# Patient Record
Sex: FEMALE | Race: WHITE | NOT HISPANIC OR LATINO | Employment: PART TIME | URBAN - NONMETROPOLITAN AREA
[De-identification: names, ages, dates, MRNs, and addresses within clinical notes are randomized per-mention and may not be internally consistent; named-entity substitution may affect disease eponyms.]

---

## 2023-03-01 LAB
APPEARANCE, URINE: CLEAR
BACTERIA, URINE: ABNORMAL /HPF
BILIRUBIN, URINE: NEGATIVE
BLOOD, URINE: ABNORMAL
COLOR, URINE: ABNORMAL
GLUCOSE, URINE: NEGATIVE MG/DL
KETONES, URINE: NEGATIVE MG/DL
LEUKOCYTE ESTERASE, URINE: ABNORMAL
NITRITE, URINE: NEGATIVE
PH, URINE: 6 (ref 5–8)
PROTEIN, URINE: NEGATIVE MG/DL
RBC, URINE: 1 /HPF (ref 0–5)
SPECIFIC GRAVITY, URINE: 1 (ref 1–1.03)
UROBILINOGEN, URINE: <2 MG/DL (ref 0–1.9)
WBC CLUMPS, URINE: ABNORMAL /HPF
WBC, URINE: 29 /HPF (ref 0–5)

## 2023-03-03 LAB — URINE CULTURE: ABNORMAL

## 2023-03-27 LAB
ALANINE AMINOTRANSFERASE (SGPT) (U/L) IN SER/PLAS: 28 U/L (ref 7–45)
ALBUMIN (G/DL) IN SER/PLAS: 4.1 G/DL (ref 3.4–5)
ALKALINE PHOSPHATASE (U/L) IN SER/PLAS: 74 U/L (ref 33–136)
ANION GAP IN SER/PLAS: 9 MMOL/L (ref 10–20)
ASPARTATE AMINOTRANSFERASE (SGOT) (U/L) IN SER/PLAS: 24 U/L (ref 9–39)
BILIRUBIN TOTAL (MG/DL) IN SER/PLAS: 1.1 MG/DL (ref 0–1.2)
CALCIUM (MG/DL) IN SER/PLAS: 9.4 MG/DL (ref 8.6–10.3)
CARBON DIOXIDE, TOTAL (MMOL/L) IN SER/PLAS: 31 MMOL/L (ref 21–32)
CHLORIDE (MMOL/L) IN SER/PLAS: 104 MMOL/L (ref 98–107)
CHOLESTEROL (MG/DL) IN SER/PLAS: 198 MG/DL (ref 0–199)
CHOLESTEROL IN HDL (MG/DL) IN SER/PLAS: 38.7 MG/DL
CHOLESTEROL/HDL RATIO: 5.1
CREATININE (MG/DL) IN SER/PLAS: 0.51 MG/DL (ref 0.5–1.05)
GFR FEMALE: >90 ML/MIN/1.73M2
GLUCOSE (MG/DL) IN SER/PLAS: 102 MG/DL (ref 74–99)
LDL: 132 MG/DL (ref 0–99)
POTASSIUM (MMOL/L) IN SER/PLAS: 4.2 MMOL/L (ref 3.5–5.3)
PROTEIN TOTAL: 6.5 G/DL (ref 6.4–8.2)
SODIUM (MMOL/L) IN SER/PLAS: 140 MMOL/L (ref 136–145)
THYROTROPIN (MIU/L) IN SER/PLAS BY DETECTION LIMIT <= 0.05 MIU/L: 0.01 MIU/L (ref 0.44–3.98)
THYROXINE (T4) FREE (NG/DL) IN SER/PLAS: 1.74 NG/DL (ref 0.61–1.12)
TRIGLYCERIDE (MG/DL) IN SER/PLAS: 136 MG/DL (ref 0–149)
UREA NITROGEN (MG/DL) IN SER/PLAS: 12 MG/DL (ref 6–23)
VLDL: 27 MG/DL (ref 0–40)

## 2023-03-28 ENCOUNTER — TELEPHONE (OUTPATIENT)
Dept: PRIMARY CARE | Facility: CLINIC | Age: 68
End: 2023-03-28

## 2023-03-28 DIAGNOSIS — E03.9 HYPOTHYROIDISM, UNSPECIFIED TYPE: Primary | ICD-10-CM

## 2023-03-28 DIAGNOSIS — E03.9 ACQUIRED HYPOTHYROIDISM: Primary | ICD-10-CM

## 2023-03-28 LAB
ESTIMATED AVERAGE GLUCOSE FOR HBA1C: 126 MG/DL
HEMOGLOBIN A1C/HEMOGLOBIN TOTAL IN BLOOD: 6 %

## 2023-03-28 RX ORDER — LEVOTHYROXINE SODIUM 125 UG/1
125 TABLET ORAL DAILY
Qty: 90 TABLET | Refills: 1 | Status: SHIPPED | OUTPATIENT
Start: 2023-03-28 | End: 2023-06-12 | Stop reason: ALTCHOICE

## 2023-03-28 NOTE — TELEPHONE ENCOUNTER
I will of the thyroid medication to her mail away pharmacy.  She should repeat labs 6 weeks after starting the new medication

## 2023-03-28 NOTE — TELEPHONE ENCOUNTER
Saw labs - she is taking 175 mg Synthroid a day - if sending in new RX send to [facility].    She is not taking any Biotin.

## 2023-05-01 DIAGNOSIS — N76.0 ACUTE VAGINITIS: ICD-10-CM

## 2023-05-02 DIAGNOSIS — N76.0 ACUTE VAGINITIS: ICD-10-CM

## 2023-06-09 ENCOUNTER — LAB (OUTPATIENT)
Dept: LAB | Facility: LAB | Age: 68
End: 2023-06-09
Payer: MEDICARE

## 2023-06-09 ENCOUNTER — TELEPHONE (OUTPATIENT)
Dept: PRIMARY CARE | Facility: CLINIC | Age: 68
End: 2023-06-09

## 2023-06-09 DIAGNOSIS — E03.9 ACQUIRED HYPOTHYROIDISM: Primary | ICD-10-CM

## 2023-06-09 DIAGNOSIS — E03.9 HYPOTHYROIDISM, UNSPECIFIED TYPE: ICD-10-CM

## 2023-06-09 LAB
THYROTROPIN (MIU/L) IN SER/PLAS BY DETECTION LIMIT <= 0.05 MIU/L: 0.1 MIU/L (ref 0.44–3.98)
THYROXINE (T4) FREE (NG/DL) IN SER/PLAS: 1.3 NG/DL (ref 0.61–1.12)

## 2023-06-09 PROCEDURE — 84443 ASSAY THYROID STIM HORMONE: CPT

## 2023-06-09 PROCEDURE — 36415 COLL VENOUS BLD VENIPUNCTURE: CPT

## 2023-06-09 PROCEDURE — 84439 ASSAY OF FREE THYROXINE: CPT

## 2023-06-09 NOTE — TELEPHONE ENCOUNTER
The patient asked to update the provider. She had labs done this morning.  She has had cramps in her foot and calf since changing the dose from 175 to 125. She thinks this was too big of a change.  Please send her medication to the mail order if there a change in her dose.

## 2023-06-12 DIAGNOSIS — E03.9 ACQUIRED HYPOTHYROIDISM: Primary | ICD-10-CM

## 2023-06-12 RX ORDER — LEVOTHYROXINE SODIUM 88 UG/1
88 TABLET ORAL DAILY
Qty: 30 TABLET | Refills: 11 | Status: SHIPPED | OUTPATIENT
Start: 2023-06-12 | End: 2023-06-15 | Stop reason: SDUPTHER

## 2023-06-12 NOTE — TELEPHONE ENCOUNTER
Cramping can occur if she is taking too much thyroid medication,  I hope if she reduces her dose she will notice less cramping,  I will send in to the pharmacy the lower dose of the thyroid medication and orders for her to repeat the labs in 6 weeks

## 2023-08-28 ENCOUNTER — LAB (OUTPATIENT)
Dept: LAB | Facility: LAB | Age: 68
End: 2023-08-28
Payer: MEDICARE

## 2023-08-28 DIAGNOSIS — E03.9 ACQUIRED HYPOTHYROIDISM: ICD-10-CM

## 2023-08-28 LAB
THYROTROPIN (MIU/L) IN SER/PLAS BY DETECTION LIMIT <= 0.05 MIU/L: 7.78 MIU/L (ref 0.44–3.98)
THYROXINE (T4) FREE (NG/DL) IN SER/PLAS: 0.76 NG/DL (ref 0.61–1.12)

## 2023-08-28 PROCEDURE — 84443 ASSAY THYROID STIM HORMONE: CPT

## 2023-08-28 PROCEDURE — 84439 ASSAY OF FREE THYROXINE: CPT

## 2023-08-28 PROCEDURE — 36415 COLL VENOUS BLD VENIPUNCTURE: CPT

## 2023-08-31 DIAGNOSIS — E03.9 ACQUIRED HYPOTHYROIDISM: ICD-10-CM

## 2023-08-31 DIAGNOSIS — E03.9 HYPOTHYROIDISM, UNSPECIFIED TYPE: Primary | ICD-10-CM

## 2023-08-31 RX ORDER — LEVOTHYROXINE SODIUM 100 UG/1
100 TABLET ORAL DAILY
Qty: 90 TABLET | Refills: 3 | Status: SHIPPED | OUTPATIENT
Start: 2023-08-31 | End: 2023-09-25

## 2023-09-17 NOTE — PROGRESS NOTES
Subjective   Patient ID: Debora Monzon is a 67 y.o. female who presents for Medicare Annual Wellness Visit Subsequent.    The patient is compliant with medications. Patient denies any side effects to the medications. The patient Is clinically stable so we will continue with current medications and lab work to confirm status ordered.       HPI    Encounter for subsequent AWV: Medicare wellness visit done, patient is in satisfactory living circumstances where the patient's needs are met.  This patient is advised to develop or update their living will and provide us a copy for the chart.    Hyperlipidemia: The patient is present today for a follow up of hyperlipidemia. She denies having any leg cramping in particular. She is trying to follow a low-fat diet.     Hypothyroidism: Patient presents for a follow up of their thyroid function. Energy wise that patient is poorly. She states that ever since she went into menopause her thyroid problems have gone out of control and is inquiring about a referral to Endocrinology.    Elevated fasting blood sugar: Patient is present today for a follow up of elevated fasting blood sugar. Is clinically stable so we will continue with current medications and lab work to confirm status ordered.     Vitamin D Deficiency: The patient presents today for a follow up of Vitamin D deficiency. Patient denies any side effects to the medications.     Pt is due for a mammogram in November.     Patient is getting the scales on the back of her head,     Review of Systems   Constitutional:  Negative for chills and fever.   HENT:  Negative for congestion, ear pain, hearing loss, rhinorrhea, sinus pressure and sinus pain.    Eyes:  Negative for pain and visual disturbance.   Respiratory:  Negative for chest tightness and shortness of breath.    Cardiovascular:  Negative for chest pain and palpitations.   Gastrointestinal:  Negative for abdominal pain, blood in stool, constipation, diarrhea, nausea and  "vomiting.   Genitourinary:  Negative for frequency and urgency.   Musculoskeletal:  Negative for joint swelling, neck pain and neck stiffness.   Neurological:  Negative for headaches.   Psychiatric/Behavioral:  Negative for sleep disturbance.          Objective   /71 (BP Location: Right arm, Patient Position: Sitting, BP Cuff Size: Adult)   Pulse 66   Temp 36.2 °C (97.2 °F) (Temporal)   Ht 1.638 m (5' 4.5\")   Wt 62.2 kg (137 lb 3.2 oz)   BMI 23.19 kg/m²     Physical Exam  Vitals reviewed.   Constitutional:       Appearance: Normal appearance.   HENT:      Head:      Comments: Dermatitis on scalp     Right Ear: Tympanic membrane and ear canal normal.      Left Ear: Tympanic membrane and ear canal normal.   Neck:      Vascular: No carotid bruit.   Cardiovascular:      Rate and Rhythm: Normal rate and regular rhythm.      Pulses: Normal pulses.      Heart sounds: Normal heart sounds.   Pulmonary:      Effort: Pulmonary effort is normal. No respiratory distress.      Breath sounds: Normal breath sounds. No wheezing.   Abdominal:      General: There is no distension.      Palpations: Abdomen is soft. There is no mass.      Tenderness: There is no abdominal tenderness. There is no right CVA tenderness, left CVA tenderness, guarding or rebound.   Musculoskeletal:      Cervical back: Normal range of motion and neck supple. No rigidity.      Right lower leg: No edema.      Left lower leg: No edema.   Lymphadenopathy:      Cervical: No cervical adenopathy.   Neurological:      Mental Status: She is alert.         Assessment/Plan   Problem List Items Addressed This Visit       Elevated fasting blood sugar    Relevant Orders    Hemoglobin A1C    Hyperlipidemia    Relevant Orders    CBC and Auto Differential    Lipid Panel    Comprehensive Metabolic Panel    Hypothyroidism    Relevant Orders    TSH with reflex to Free T4 if abnormal    Referral to Endocrinology    Vitamin D deficiency    Relevant Orders    Vitamin D " 25-Hydroxy,Total (for eval of Vitamin D levels)    Encounter for screening mammogram for breast cancer    Relevant Orders    BI mammo bilateral diagnostic    Encounter for subsequent annual wellness visit (AWV) in Medicare patient - Primary    Relevant Orders    Follow Up In [facility] - PCP - Medicare Annual     Other Visit Diagnoses       Acute vaginitis        Relevant Medications    estrogens, conjugated, (Premarin) vaginal cream (Start on 9/25/2023)    History of screening mammography        Relevant Orders    BI mammo bilateral diagnostic    Dermatosis of scalp        Relevant Medications    clobetasol (Temovate) 0.05 % external solution            1.  Well visit    Today in the office you had your annual wellness exam    You are due for a mammogram for breast cancer screening in November we will give you that order    You are up-to-date with your colonoscopy for colon cancer screening    Please have labs done today we will check kidney function liver function blood sugar screening for diabetes check your cholesterol we will check your vitamin D and we will check her thyroid    With your history of hypothyroidism and postmenopausal symptoms we will give you a referral to meet with endocrinology    Continue eating a heart healthy diet a diet rich with fresh fruit and fresh vegetable 5-7 servings a day if possible along with lean protein avoid simple sugars and fast foods    Keep being active exercise dancing 30 minutes of activity a day is ideal certainly would call if you have chest pains with your activities    You have some dermatitis on your scalp we will prescribe a steroid solution clobetasol this can be applied once or twice a day    I did send a prescription in for your Premarin to your mail away    If all labs returned normal and you remain healthy I will see you back in 1 year    You did receive your flu shot today    You could consider getting a pneumonia vaccine and shingles vaccine at  the pharmacy        Follow up in: 12 month(s) or sooner if needed with labs today.     Scribe Attestation  By signing my name below, I, Martha Alarcon   attest that this documentation has been prepared under the direction and in the presence of Gurjit Streeter MD.

## 2023-09-22 ENCOUNTER — LAB (OUTPATIENT)
Dept: LAB | Facility: LAB | Age: 68
End: 2023-09-22
Payer: MEDICARE

## 2023-09-22 ENCOUNTER — OFFICE VISIT (OUTPATIENT)
Dept: PRIMARY CARE | Facility: CLINIC | Age: 68
End: 2023-09-22
Payer: MEDICARE

## 2023-09-22 VITALS
HEIGHT: 65 IN | BODY MASS INDEX: 22.86 KG/M2 | HEART RATE: 66 BPM | SYSTOLIC BLOOD PRESSURE: 116 MMHG | TEMPERATURE: 97.2 F | WEIGHT: 137.2 LBS | DIASTOLIC BLOOD PRESSURE: 71 MMHG

## 2023-09-22 DIAGNOSIS — E78.2 MIXED HYPERLIPIDEMIA: ICD-10-CM

## 2023-09-22 DIAGNOSIS — E55.9 VITAMIN D DEFICIENCY: ICD-10-CM

## 2023-09-22 DIAGNOSIS — Z00.00 ENCOUNTER FOR SUBSEQUENT ANNUAL WELLNESS VISIT (AWV) IN MEDICARE PATIENT: Primary | ICD-10-CM

## 2023-09-22 DIAGNOSIS — Z00.00 ROUTINE GENERAL MEDICAL EXAMINATION AT HEALTH CARE FACILITY: ICD-10-CM

## 2023-09-22 DIAGNOSIS — E03.9 ACQUIRED HYPOTHYROIDISM: ICD-10-CM

## 2023-09-22 DIAGNOSIS — Z92.89 HISTORY OF SCREENING MAMMOGRAPHY: ICD-10-CM

## 2023-09-22 DIAGNOSIS — Z12.31 ENCOUNTER FOR SCREENING MAMMOGRAM FOR BREAST CANCER: ICD-10-CM

## 2023-09-22 DIAGNOSIS — N76.0 ACUTE VAGINITIS: ICD-10-CM

## 2023-09-22 DIAGNOSIS — R73.01 ELEVATED FASTING BLOOD SUGAR: ICD-10-CM

## 2023-09-22 DIAGNOSIS — L98.9 DERMATOSIS OF SCALP: ICD-10-CM

## 2023-09-22 DIAGNOSIS — N95.2 POST-MENOPAUSE ATROPHIC VAGINITIS: ICD-10-CM

## 2023-09-22 PROBLEM — L30.9 ECZEMA OF SCALP: Status: ACTIVE | Noted: 2023-09-22

## 2023-09-22 PROBLEM — J40 BRONCHITIS: Status: ACTIVE | Noted: 2023-09-22

## 2023-09-22 PROBLEM — S01.01XA SCALP LACERATION: Status: ACTIVE | Noted: 2023-09-22

## 2023-09-22 PROBLEM — S76.309A HAMSTRING INJURY: Status: ACTIVE | Noted: 2023-09-22

## 2023-09-22 PROBLEM — S09.90XA CLOSED HEAD INJURY: Status: RESOLVED | Noted: 2022-06-18 | Resolved: 2023-09-22
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
## 2023-09-22 PROBLEM — E78.5 HYPERLIPIDEMIA: Status: ACTIVE | Noted: 2023-09-22

## 2023-09-22 PROBLEM — R30.0 DYSURIA: Status: ACTIVE | Noted: 2023-09-22

## 2023-09-22 PROBLEM — N90.89 VULVAR LESION: Status: RESOLVED | Noted: 2021-12-16 | Resolved: 2023-09-22

## 2023-09-22 PROBLEM — M19.90 ARTHRITIS, DEGENERATIVE: Status: ACTIVE | Noted: 2023-09-22

## 2023-09-22 PROBLEM — S89.92XS: Status: ACTIVE | Noted: 2023-09-22

## 2023-09-22 PROBLEM — L73.9 FOLLICULITIS: Status: ACTIVE | Noted: 2023-09-22

## 2023-09-22 PROBLEM — S82.402A LEFT FIBULAR FRACTURE: Status: ACTIVE | Noted: 2023-09-22

## 2023-09-22 LAB
BASOPHILS (10*3/UL) IN BLOOD BY AUTOMATED COUNT: 0.02 X10E9/L (ref 0–0.1)
BASOPHILS/100 LEUKOCYTES IN BLOOD BY AUTOMATED COUNT: 0.3 % (ref 0–2)
EOSINOPHILS (10*3/UL) IN BLOOD BY AUTOMATED COUNT: 0.18 X10E9/L (ref 0–0.7)
EOSINOPHILS/100 LEUKOCYTES IN BLOOD BY AUTOMATED COUNT: 2.3 % (ref 0–6)
ERYTHROCYTE DISTRIBUTION WIDTH (RATIO) BY AUTOMATED COUNT: 13.5 % (ref 11.5–14.5)
ERYTHROCYTE MEAN CORPUSCULAR HEMOGLOBIN CONCENTRATION (G/DL) BY AUTOMATED: 33.5 G/DL (ref 32–36)
ERYTHROCYTE MEAN CORPUSCULAR VOLUME (FL) BY AUTOMATED COUNT: 94 FL (ref 80–100)
ERYTHROCYTES (10*6/UL) IN BLOOD BY AUTOMATED COUNT: 4.5 X10E12/L (ref 4–5.2)
ESTIMATED AVERAGE GLUCOSE FOR HBA1C: 117 MG/DL
HEMATOCRIT (%) IN BLOOD BY AUTOMATED COUNT: 42.4 % (ref 36–46)
HEMOGLOBIN (G/DL) IN BLOOD: 14.2 G/DL (ref 12–16)
HEMOGLOBIN A1C/HEMOGLOBIN TOTAL IN BLOOD: 5.7 %
IMMATURE GRANULOCYTES/100 LEUKOCYTES IN BLOOD BY AUTOMATED COUNT: 0.4 % (ref 0–0.9)
LEUKOCYTES (10*3/UL) IN BLOOD BY AUTOMATED COUNT: 7.9 X10E9/L (ref 4.4–11.3)
LYMPHOCYTES (10*3/UL) IN BLOOD BY AUTOMATED COUNT: 1.46 X10E9/L (ref 1.2–4.8)
LYMPHOCYTES/100 LEUKOCYTES IN BLOOD BY AUTOMATED COUNT: 18.4 % (ref 13–44)
MONOCYTES (10*3/UL) IN BLOOD BY AUTOMATED COUNT: 0.49 X10E9/L (ref 0.1–1)
MONOCYTES/100 LEUKOCYTES IN BLOOD BY AUTOMATED COUNT: 6.2 % (ref 2–10)
NEUTROPHILS (10*3/UL) IN BLOOD BY AUTOMATED COUNT: 5.75 X10E9/L (ref 1.2–7.7)
NEUTROPHILS/100 LEUKOCYTES IN BLOOD BY AUTOMATED COUNT: 72.4 % (ref 40–80)
NRBC (PER 100 WBCS) BY AUTOMATED COUNT: 0 /100 WBC (ref 0–0)
PLATELETS (10*3/UL) IN BLOOD AUTOMATED COUNT: 268 X10E9/L (ref 150–450)

## 2023-09-22 PROCEDURE — 83036 HEMOGLOBIN GLYCOSYLATED A1C: CPT

## 2023-09-22 PROCEDURE — G0008 ADMIN INFLUENZA VIRUS VAC: HCPCS | Performed by: FAMILY MEDICINE

## 2023-09-22 PROCEDURE — 85025 COMPLETE CBC W/AUTO DIFF WBC: CPT

## 2023-09-22 PROCEDURE — 84439 ASSAY OF FREE THYROXINE: CPT

## 2023-09-22 PROCEDURE — 99397 PER PM REEVAL EST PAT 65+ YR: CPT | Performed by: FAMILY MEDICINE

## 2023-09-22 PROCEDURE — 1160F RVW MEDS BY RX/DR IN RCRD: CPT | Performed by: FAMILY MEDICINE

## 2023-09-22 PROCEDURE — 82306 VITAMIN D 25 HYDROXY: CPT

## 2023-09-22 PROCEDURE — 1170F FXNL STATUS ASSESSED: CPT | Performed by: FAMILY MEDICINE

## 2023-09-22 PROCEDURE — 1036F TOBACCO NON-USER: CPT | Performed by: FAMILY MEDICINE

## 2023-09-22 PROCEDURE — 84443 ASSAY THYROID STIM HORMONE: CPT

## 2023-09-22 PROCEDURE — 36415 COLL VENOUS BLD VENIPUNCTURE: CPT

## 2023-09-22 PROCEDURE — 1159F MED LIST DOCD IN RCRD: CPT | Performed by: FAMILY MEDICINE

## 2023-09-22 PROCEDURE — 80053 COMPREHEN METABOLIC PANEL: CPT

## 2023-09-22 PROCEDURE — G0439 PPPS, SUBSEQ VISIT: HCPCS | Performed by: FAMILY MEDICINE

## 2023-09-22 PROCEDURE — 80061 LIPID PANEL: CPT

## 2023-09-22 PROCEDURE — 90686 IIV4 VACC NO PRSV 0.5 ML IM: CPT | Performed by: FAMILY MEDICINE

## 2023-09-22 RX ORDER — NIACIN 500 MG/1
500 TABLET, EXTENDED RELEASE ORAL NIGHTLY
COMMUNITY

## 2023-09-22 RX ORDER — CHOLECALCIFEROL (VITAMIN D3) 125 MCG
1 CAPSULE ORAL DAILY
COMMUNITY
Start: 2022-09-28

## 2023-09-22 RX ORDER — IBUPROFEN 800 MG/1
1 TABLET ORAL 3 TIMES DAILY PRN
COMMUNITY
Start: 2013-11-29 | End: 2024-01-05 | Stop reason: ALTCHOICE

## 2023-09-22 RX ORDER — CLOBETASOL PROPIONATE 0.46 MG/ML
SOLUTION TOPICAL 2 TIMES DAILY
Qty: 60 ML | Refills: 3 | Status: SHIPPED | OUTPATIENT
Start: 2023-09-22 | End: 2023-09-22 | Stop reason: SDUPTHER

## 2023-09-22 RX ORDER — BLACK COHOSH ROOT 200 MG
1 CAPSULE ORAL DAILY
COMMUNITY

## 2023-09-22 RX ORDER — CLOBETASOL PROPIONATE 0.46 MG/ML
SOLUTION TOPICAL 2 TIMES DAILY
Qty: 60 ML | Refills: 3 | Status: SHIPPED | OUTPATIENT
Start: 2023-09-22 | End: 2024-09-21

## 2023-09-22 ASSESSMENT — ENCOUNTER SYMPTOMS
ABDOMINAL PAIN: 0
DIARRHEA: 0
BLOOD IN STOOL: 0
CONSTIPATION: 0
NECK STIFFNESS: 0
FREQUENCY: 0
SINUS PAIN: 0
HEADACHES: 0
PALPITATIONS: 0
SINUS PRESSURE: 0
SLEEP DISTURBANCE: 0
SHORTNESS OF BREATH: 0
FEVER: 0
EYE PAIN: 0
VOMITING: 0
NECK PAIN: 0
NAUSEA: 0
CHEST TIGHTNESS: 0
CHILLS: 0
RHINORRHEA: 0
JOINT SWELLING: 0

## 2023-09-22 ASSESSMENT — ACTIVITIES OF DAILY LIVING (ADL)
GROCERY_SHOPPING: INDEPENDENT
BATHING: INDEPENDENT
DRESSING: INDEPENDENT
DOING_HOUSEWORK: INDEPENDENT
MANAGING_FINANCES: INDEPENDENT
TAKING_MEDICATION: INDEPENDENT

## 2023-09-22 NOTE — PATIENT INSTRUCTIONS
1.  Well visit    Today in the office you had your annual wellness exam    You are due for a mammogram for breast cancer screening in November we will give you that order    You are up-to-date with your colonoscopy for colon cancer screening    Please have labs done today we will check kidney function liver function blood sugar screening for diabetes check your cholesterol we will check your vitamin D and we will check her thyroid    With your history of hypothyroidism and postmenopausal symptoms we will give you a referral to meet with endocrinology    Continue eating a heart healthy diet a diet rich with fresh fruit and fresh vegetable 5-7 servings a day if possible along with lean protein avoid simple sugars and fast foods    Keep being active exercise dancing 30 minutes of activity a day is ideal certainly would call if you have chest pains with your activities    You have some dermatitis on your scalp we will prescribe a steroid solution clobetasol this can be applied once or twice a day    I did send a prescription in for your Premarin to your mail away    If all labs returned normal and you remain healthy I will see you back in 1 year    You did receive your flu shot today    You could consider getting a pneumonia vaccine and shingles vaccine at the pharmacy

## 2023-09-23 LAB
ALANINE AMINOTRANSFERASE (SGPT) (U/L) IN SER/PLAS: 13 U/L (ref 7–45)
ALBUMIN (G/DL) IN SER/PLAS: 4.5 G/DL (ref 3.4–5)
ALKALINE PHOSPHATASE (U/L) IN SER/PLAS: 69 U/L (ref 33–136)
ANION GAP IN SER/PLAS: 13 MMOL/L (ref 10–20)
ASPARTATE AMINOTRANSFERASE (SGOT) (U/L) IN SER/PLAS: 17 U/L (ref 9–39)
BILIRUBIN TOTAL (MG/DL) IN SER/PLAS: 0.8 MG/DL (ref 0–1.2)
CALCIDIOL (25 OH VITAMIN D3) (NG/ML) IN SER/PLAS: 72 NG/ML
CALCIUM (MG/DL) IN SER/PLAS: 9.8 MG/DL (ref 8.6–10.6)
CARBON DIOXIDE, TOTAL (MMOL/L) IN SER/PLAS: 29 MMOL/L (ref 21–32)
CHLORIDE (MMOL/L) IN SER/PLAS: 105 MMOL/L (ref 98–107)
CHOLESTEROL (MG/DL) IN SER/PLAS: 246 MG/DL (ref 0–199)
CHOLESTEROL IN HDL (MG/DL) IN SER/PLAS: 46.4 MG/DL
CHOLESTEROL/HDL RATIO: 5.3
CREATININE (MG/DL) IN SER/PLAS: 0.65 MG/DL (ref 0.5–1.05)
GFR FEMALE: >90 ML/MIN/1.73M2
GLUCOSE (MG/DL) IN SER/PLAS: 94 MG/DL (ref 74–99)
LDL: 177 MG/DL (ref 0–99)
POTASSIUM (MMOL/L) IN SER/PLAS: 4 MMOL/L (ref 3.5–5.3)
PROTEIN TOTAL: 7.2 G/DL (ref 6.4–8.2)
SODIUM (MMOL/L) IN SER/PLAS: 143 MMOL/L (ref 136–145)
THYROTROPIN (MIU/L) IN SER/PLAS BY DETECTION LIMIT <= 0.05 MIU/L: 10.21 MIU/L (ref 0.44–3.98)
THYROXINE (T4) FREE (NG/DL) IN SER/PLAS: 1.16 NG/DL (ref 0.78–1.48)
TRIGLYCERIDE (MG/DL) IN SER/PLAS: 114 MG/DL (ref 0–149)
UREA NITROGEN (MG/DL) IN SER/PLAS: 12 MG/DL (ref 6–23)
VLDL: 23 MG/DL (ref 0–40)

## 2023-09-25 DIAGNOSIS — E03.9 ACQUIRED HYPOTHYROIDISM: Primary | ICD-10-CM

## 2023-09-25 DIAGNOSIS — Z12.31 BREAST CANCER SCREENING BY MAMMOGRAM: ICD-10-CM

## 2023-09-25 RX ORDER — LEVOTHYROXINE SODIUM 125 UG/1
125 TABLET ORAL DAILY
Qty: 30 TABLET | Refills: 11 | Status: SHIPPED
Start: 2023-09-25 | End: 2024-05-03 | Stop reason: ALTCHOICE

## 2023-11-13 ENCOUNTER — APPOINTMENT (OUTPATIENT)
Dept: RADIOLOGY | Facility: CLINIC | Age: 68
End: 2023-11-13
Payer: MEDICARE

## 2023-11-20 ENCOUNTER — ANCILLARY PROCEDURE (OUTPATIENT)
Dept: RADIOLOGY | Facility: CLINIC | Age: 68
End: 2023-11-20
Payer: MEDICARE

## 2023-11-20 VITALS — HEIGHT: 65 IN | WEIGHT: 140 LBS | BODY MASS INDEX: 23.32 KG/M2

## 2023-11-20 DIAGNOSIS — Z12.31 BREAST CANCER SCREENING BY MAMMOGRAM: ICD-10-CM

## 2023-11-20 PROCEDURE — 77063 BREAST TOMOSYNTHESIS BI: CPT

## 2023-11-20 PROCEDURE — 77063 BREAST TOMOSYNTHESIS BI: CPT | Mod: BILATERAL PROCEDURE | Performed by: RADIOLOGY

## 2023-11-20 PROCEDURE — 77067 SCR MAMMO BI INCL CAD: CPT | Mod: BILATERAL PROCEDURE | Performed by: RADIOLOGY

## 2023-11-21 ENCOUNTER — LAB (OUTPATIENT)
Dept: LAB | Facility: LAB | Age: 68
End: 2023-11-21
Payer: MEDICARE

## 2023-11-21 DIAGNOSIS — E03.9 ACQUIRED HYPOTHYROIDISM: ICD-10-CM

## 2023-11-21 LAB — TSH SERPL-ACNC: 2.54 MIU/L (ref 0.44–3.98)

## 2023-11-21 PROCEDURE — 84443 ASSAY THYROID STIM HORMONE: CPT

## 2023-11-21 PROCEDURE — 36415 COLL VENOUS BLD VENIPUNCTURE: CPT

## 2024-01-05 ENCOUNTER — LAB (OUTPATIENT)
Dept: LAB | Facility: LAB | Age: 69
End: 2024-01-05
Payer: MEDICARE

## 2024-01-05 ENCOUNTER — OFFICE VISIT (OUTPATIENT)
Dept: ENDOCRINOLOGY | Facility: CLINIC | Age: 69
End: 2024-01-05
Payer: MEDICARE

## 2024-01-05 VITALS
HEART RATE: 78 BPM | HEIGHT: 65 IN | DIASTOLIC BLOOD PRESSURE: 70 MMHG | WEIGHT: 140.8 LBS | SYSTOLIC BLOOD PRESSURE: 110 MMHG | BODY MASS INDEX: 23.46 KG/M2 | RESPIRATION RATE: 16 BRPM

## 2024-01-05 DIAGNOSIS — E03.9 HYPOTHYROIDISM, UNSPECIFIED TYPE: Primary | ICD-10-CM

## 2024-01-05 DIAGNOSIS — E03.9 HYPOTHYROIDISM, UNSPECIFIED TYPE: ICD-10-CM

## 2024-01-05 LAB
T3FREE SERPL-MCNC: 2.7 PG/ML (ref 2.3–4.2)
T4 FREE SERPL-MCNC: 1.28 NG/DL (ref 0.78–1.48)
TSH SERPL-ACNC: 6.23 MIU/L (ref 0.44–3.98)

## 2024-01-05 PROCEDURE — 84443 ASSAY THYROID STIM HORMONE: CPT

## 2024-01-05 PROCEDURE — 36415 COLL VENOUS BLD VENIPUNCTURE: CPT

## 2024-01-05 PROCEDURE — 1036F TOBACCO NON-USER: CPT | Performed by: INTERNAL MEDICINE

## 2024-01-05 PROCEDURE — 1159F MED LIST DOCD IN RCRD: CPT | Performed by: INTERNAL MEDICINE

## 2024-01-05 PROCEDURE — 99204 OFFICE O/P NEW MOD 45 MIN: CPT | Performed by: INTERNAL MEDICINE

## 2024-01-05 PROCEDURE — 84481 FREE ASSAY (FT-3): CPT

## 2024-01-05 PROCEDURE — 84439 ASSAY OF FREE THYROXINE: CPT

## 2024-01-05 PROCEDURE — 1160F RVW MEDS BY RX/DR IN RCRD: CPT | Performed by: INTERNAL MEDICINE

## 2024-01-05 ASSESSMENT — ENCOUNTER SYMPTOMS
COUGH: 0
FEVER: 0
CHILLS: 0
NAUSEA: 0
FATIGUE: 0
PALPITATIONS: 0
SHORTNESS OF BREATH: 0
VOMITING: 0
HEADACHES: 0
DIARRHEA: 0

## 2024-01-05 NOTE — PATIENT INSTRUCTIONS
Blood work today  Adjustment in your medication dose based on levels  Follow-up in 4 months with repeat blood work at 2-month yuri  Please call or message with any concerns or questions
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
## 2024-01-06 DIAGNOSIS — E03.9 ACQUIRED HYPOTHYROIDISM: ICD-10-CM

## 2024-01-06 RX ORDER — LEVOTHYROXINE SODIUM 150 UG/1
150 TABLET ORAL DAILY
Qty: 90 TABLET | Refills: 1 | Status: SHIPPED | OUTPATIENT
Start: 2024-01-06 | End: 2024-01-08 | Stop reason: SDUPTHER

## 2024-01-08 DIAGNOSIS — E03.9 ACQUIRED HYPOTHYROIDISM: ICD-10-CM

## 2024-01-08 RX ORDER — LEVOTHYROXINE SODIUM 150 UG/1
150 TABLET ORAL DAILY
Qty: 90 TABLET | Refills: 1 | Status: SHIPPED | OUTPATIENT
Start: 2024-01-08 | End: 2024-05-03 | Stop reason: SDUPTHER

## 2024-03-07 ENCOUNTER — LAB (OUTPATIENT)
Dept: LAB | Facility: LAB | Age: 69
End: 2024-03-07
Payer: MEDICARE

## 2024-03-07 DIAGNOSIS — E03.9 HYPOTHYROIDISM, UNSPECIFIED TYPE: Primary | ICD-10-CM

## 2024-03-07 DIAGNOSIS — E03.9 ACQUIRED HYPOTHYROIDISM: ICD-10-CM

## 2024-03-07 LAB
T4 FREE SERPL-MCNC: 1.11 NG/DL (ref 0.61–1.12)
TSH SERPL-ACNC: 0.43 MIU/L (ref 0.44–3.98)

## 2024-03-07 PROCEDURE — 84443 ASSAY THYROID STIM HORMONE: CPT

## 2024-03-07 PROCEDURE — 84439 ASSAY OF FREE THYROXINE: CPT

## 2024-03-07 PROCEDURE — 36415 COLL VENOUS BLD VENIPUNCTURE: CPT

## 2024-03-28 ENCOUNTER — TELEPHONE (OUTPATIENT)
Dept: PRIMARY CARE | Facility: CLINIC | Age: 69
End: 2024-03-28
Payer: MEDICARE

## 2024-03-28 DIAGNOSIS — E78.2 MIXED HYPERLIPIDEMIA: ICD-10-CM

## 2024-03-28 DIAGNOSIS — E03.9 HYPOTHYROIDISM, UNSPECIFIED TYPE: ICD-10-CM

## 2024-03-28 DIAGNOSIS — Z00.00 WELLNESS EXAMINATION: Primary | ICD-10-CM

## 2024-03-28 DIAGNOSIS — E55.9 VITAMIN D DEFICIENCY: ICD-10-CM

## 2024-03-28 NOTE — TELEPHONE ENCOUNTER
Patient is requesting Vitamin D3 blood work orders (states she wants to make sure she is not taking too much)     She is also having cramping in her feet at night, would like orders placed for whatever you recommend for this    (Is having thyroid labs drawn for endo soon, would like all at once)     Did not request an appointment

## 2024-04-24 ENCOUNTER — LAB (OUTPATIENT)
Dept: LAB | Facility: LAB | Age: 69
End: 2024-04-24
Payer: MEDICARE

## 2024-04-24 DIAGNOSIS — E55.9 VITAMIN D DEFICIENCY: ICD-10-CM

## 2024-04-24 DIAGNOSIS — E78.2 MIXED HYPERLIPIDEMIA: ICD-10-CM

## 2024-04-24 DIAGNOSIS — E03.9 HYPOTHYROIDISM, UNSPECIFIED TYPE: ICD-10-CM

## 2024-04-24 LAB
25(OH)D3 SERPL-MCNC: 72 NG/ML (ref 30–100)
ALBUMIN SERPL BCP-MCNC: 4.3 G/DL (ref 3.4–5)
ALP SERPL-CCNC: 70 U/L (ref 33–136)
ALT SERPL W P-5'-P-CCNC: 13 U/L (ref 7–45)
ANION GAP SERPL CALC-SCNC: 10 MMOL/L (ref 10–20)
AST SERPL W P-5'-P-CCNC: 16 U/L (ref 9–39)
BASOPHILS # BLD AUTO: 0.06 X10*3/UL (ref 0–0.1)
BASOPHILS NFR BLD AUTO: 0.8 %
BILIRUB SERPL-MCNC: 0.8 MG/DL (ref 0–1.2)
BUN SERPL-MCNC: 11 MG/DL (ref 6–23)
CALCIUM SERPL-MCNC: 9.2 MG/DL (ref 8.6–10.3)
CHLORIDE SERPL-SCNC: 106 MMOL/L (ref 98–107)
CHOLEST SERPL-MCNC: 215 MG/DL (ref 0–199)
CHOLESTEROL/HDL RATIO: 4.8
CO2 SERPL-SCNC: 28 MMOL/L (ref 21–32)
CREAT SERPL-MCNC: 0.66 MG/DL (ref 0.5–1.05)
EGFRCR SERPLBLD CKD-EPI 2021: >90 ML/MIN/1.73M*2
EOSINOPHIL # BLD AUTO: 1.01 X10*3/UL (ref 0–0.7)
EOSINOPHIL NFR BLD AUTO: 13.8 %
ERYTHROCYTE [DISTWIDTH] IN BLOOD BY AUTOMATED COUNT: 13.1 % (ref 11.5–14.5)
GLUCOSE SERPL-MCNC: 109 MG/DL (ref 74–99)
HCT VFR BLD AUTO: 42.9 % (ref 36–46)
HDLC SERPL-MCNC: 44.8 MG/DL
HGB BLD-MCNC: 13.8 G/DL (ref 12–16)
IMM GRANULOCYTES # BLD AUTO: 0.03 X10*3/UL (ref 0–0.7)
IMM GRANULOCYTES NFR BLD AUTO: 0.4 % (ref 0–0.9)
LDLC SERPL CALC-MCNC: 152 MG/DL
LYMPHOCYTES # BLD AUTO: 1.34 X10*3/UL (ref 1.2–4.8)
LYMPHOCYTES NFR BLD AUTO: 18.3 %
MCH RBC QN AUTO: 30.3 PG (ref 26–34)
MCHC RBC AUTO-ENTMCNC: 32.2 G/DL (ref 32–36)
MCV RBC AUTO: 94 FL (ref 80–100)
MONOCYTES # BLD AUTO: 0.4 X10*3/UL (ref 0.1–1)
MONOCYTES NFR BLD AUTO: 5.5 %
NEUTROPHILS # BLD AUTO: 4.48 X10*3/UL (ref 1.2–7.7)
NEUTROPHILS NFR BLD AUTO: 61.2 %
NON HDL CHOLESTEROL: 170 MG/DL (ref 0–149)
NRBC BLD-RTO: 0 /100 WBCS (ref 0–0)
PLATELET # BLD AUTO: 278 X10*3/UL (ref 150–450)
POTASSIUM SERPL-SCNC: 4 MMOL/L (ref 3.5–5.3)
PROT SERPL-MCNC: 6.6 G/DL (ref 6.4–8.2)
RBC # BLD AUTO: 4.55 X10*6/UL (ref 4–5.2)
SODIUM SERPL-SCNC: 140 MMOL/L (ref 136–145)
T3FREE SERPL-MCNC: 3.4 PG/ML (ref 2.3–4.2)
T4 FREE SERPL-MCNC: 1.32 NG/DL (ref 0.61–1.12)
TRIGL SERPL-MCNC: 89 MG/DL (ref 0–149)
TSH SERPL-ACNC: 1.26 MIU/L (ref 0.44–3.98)
VLDL: 18 MG/DL (ref 0–40)
WBC # BLD AUTO: 7.3 X10*3/UL (ref 4.4–11.3)

## 2024-04-24 PROCEDURE — 84443 ASSAY THYROID STIM HORMONE: CPT

## 2024-04-24 PROCEDURE — 80053 COMPREHEN METABOLIC PANEL: CPT

## 2024-04-24 PROCEDURE — 82306 VITAMIN D 25 HYDROXY: CPT

## 2024-04-24 PROCEDURE — 80061 LIPID PANEL: CPT

## 2024-04-24 PROCEDURE — 36415 COLL VENOUS BLD VENIPUNCTURE: CPT

## 2024-04-24 PROCEDURE — 84439 ASSAY OF FREE THYROXINE: CPT

## 2024-04-24 PROCEDURE — 85025 COMPLETE CBC W/AUTO DIFF WBC: CPT

## 2024-04-24 PROCEDURE — 84481 FREE ASSAY (FT-3): CPT

## 2024-05-03 ENCOUNTER — OFFICE VISIT (OUTPATIENT)
Dept: ENDOCRINOLOGY | Facility: CLINIC | Age: 69
End: 2024-05-03
Payer: MEDICARE

## 2024-05-03 ENCOUNTER — TELEPHONE (OUTPATIENT)
Dept: PRIMARY CARE | Facility: CLINIC | Age: 69
End: 2024-05-03

## 2024-05-03 VITALS
WEIGHT: 142.8 LBS | HEIGHT: 65 IN | SYSTOLIC BLOOD PRESSURE: 122 MMHG | DIASTOLIC BLOOD PRESSURE: 70 MMHG | RESPIRATION RATE: 16 BRPM | HEART RATE: 76 BPM | BODY MASS INDEX: 23.79 KG/M2

## 2024-05-03 DIAGNOSIS — E03.9 HYPOTHYROIDISM, UNSPECIFIED TYPE: Primary | ICD-10-CM

## 2024-05-03 DIAGNOSIS — E03.9 ACQUIRED HYPOTHYROIDISM: ICD-10-CM

## 2024-05-03 PROCEDURE — 1159F MED LIST DOCD IN RCRD: CPT | Performed by: INTERNAL MEDICINE

## 2024-05-03 PROCEDURE — 1160F RVW MEDS BY RX/DR IN RCRD: CPT | Performed by: INTERNAL MEDICINE

## 2024-05-03 PROCEDURE — 1036F TOBACCO NON-USER: CPT | Performed by: INTERNAL MEDICINE

## 2024-05-03 PROCEDURE — 99213 OFFICE O/P EST LOW 20 MIN: CPT | Performed by: INTERNAL MEDICINE

## 2024-05-03 RX ORDER — LEVOTHYROXINE SODIUM 150 UG/1
150 TABLET ORAL DAILY
Qty: 96 TABLET | Refills: 2 | Status: SHIPPED | OUTPATIENT
Start: 2024-05-03 | End: 2025-05-03

## 2024-05-03 ASSESSMENT — ENCOUNTER SYMPTOMS
PALPITATIONS: 0
HEADACHES: 0
COUGH: 0
SHORTNESS OF BREATH: 0
CHILLS: 0
NAUSEA: 0
DIARRHEA: 0
FEVER: 0
FATIGUE: 0
VOMITING: 0

## 2024-05-03 NOTE — PATIENT INSTRUCTIONS
Take extra 1/2 pill of thyroid med one day a week  Recheck labs in 2 months  Follow up in 6 months
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
## 2024-07-05 ENCOUNTER — OFFICE VISIT (OUTPATIENT)
Dept: PRIMARY CARE | Facility: CLINIC | Age: 69
End: 2024-07-05
Payer: MEDICARE

## 2024-07-05 VITALS
BODY MASS INDEX: 22.8 KG/M2 | SYSTOLIC BLOOD PRESSURE: 100 MMHG | WEIGHT: 137 LBS | DIASTOLIC BLOOD PRESSURE: 61 MMHG | TEMPERATURE: 98 F | HEART RATE: 91 BPM | RESPIRATION RATE: 14 BRPM | OXYGEN SATURATION: 96 %

## 2024-07-05 DIAGNOSIS — L30.9 DERMATITIS: Primary | ICD-10-CM

## 2024-07-05 PROCEDURE — 1160F RVW MEDS BY RX/DR IN RCRD: CPT | Performed by: FAMILY MEDICINE

## 2024-07-05 PROCEDURE — 1159F MED LIST DOCD IN RCRD: CPT | Performed by: FAMILY MEDICINE

## 2024-07-05 PROCEDURE — 1036F TOBACCO NON-USER: CPT | Performed by: FAMILY MEDICINE

## 2024-07-05 PROCEDURE — 99213 OFFICE O/P EST LOW 20 MIN: CPT | Performed by: FAMILY MEDICINE

## 2024-07-05 RX ORDER — TRIAMCINOLONE ACETONIDE 1 MG/G
CREAM TOPICAL 3 TIMES DAILY
Qty: 30 G | Refills: 0 | Status: SHIPPED | OUTPATIENT
Start: 2024-07-05 | End: 2024-07-19

## 2024-07-05 ASSESSMENT — ENCOUNTER SYMPTOMS
SHORTNESS OF BREATH: 0
FEVER: 0
CHILLS: 0
DIZZINESS: 0
FATIGUE: 0
CONSTIPATION: 0
DIARRHEA: 0

## 2024-07-05 NOTE — PROGRESS NOTES
Subjective   Patient ID: Debora Monzon is a 68 y.o. female who presents for Rash (On left leg, forearms and by her mouth. /Has been using old scripts but not working. /).    Rash   Patient reports rash is located on right cheek, corner of mouth, bilateral elbow regions and anterior lower leg  Rash is itchy  Had similar rashes in past with hormonal changes  Did start taking premarin  Has seen several dermatologists and had several skin biopsies that were inconclusive  Has used topical clobetasol, mupirocin and triple antibiotic which have been helpful but not resolved the rash  No new topicals  Uses cetaphil cream topically  Reports dx of scalp eczema in past  Reports rash on skin can get very dry and thick and flakes off     Review of Systems   Skin:  Positive for rash.   All other systems reviewed and are negative.    Objective   /61 (BP Location: Right arm, Patient Position: Sitting, BP Cuff Size: Adult)   Pulse 91   Temp 36.7 °C (98 °F) (Temporal)   Resp 14   Wt 62.1 kg (137 lb)   SpO2 96%   BMI 22.80 kg/m²     Physical Exam  Constitutional: Well developed, well nourished, alert and in no acute distress   Eyes: Normal external exam.   Cardiovascular: Regular rate and rhythm, normal S1 and S2, no murmurs, gallops, or rubs. Radial pulses normal. No peripheral edema.  Pulmonary: No respiratory distress, lungs clear to auscultation bilaterally. No wheezes, rhonchi, rales.  Skin: Warm, well perfused, normal skin turgor, +thick erythematous patches with excoriations located on extensor region of both arms, anterior LLE and right lateral aspect of mouth.  Neurologic: Cranial nerves II-XII grossly intact.   Psychiatric: Mood calm and affect normal.    Assessment/Plan   Trial trimacinolone very sparingly on affected regions 3x/day for up to 14 days. May use on face but only 2x/day for 7 days. Be cautious in the sunlight as topical steroids may cause hypopigmentation and thinning of skin.    We discussed  consideration of psoriasis as a diagnosis    Please contact me if rash is not improving

## 2024-07-05 NOTE — PROGRESS NOTES
Subjective   Patient ID: Debora Monzon is a 68 y.o. female who presents for Rash (On left leg, forearms and by her mouth. /Has been using old scripts but not working. /).    Rash  Pertinent negatives include no diarrhea, fatigue, fever or shortness of breath.      Would get rash with menstrual cycle starting at age 13  Went into menopause and experienced the same thing, believes it is hormonal  Tried triple antibiotic on face, Uprosym, was using old tube of clobestasol  Usually they resolve on their own  This current one started 4 weeks ago  +itchy  Has had autoimmune work up but was negative  No recent change to eating habits  Did start magnesium recently but thinks the rash was already  there before she started taking it  Location of rash varies with each episode, usually occurs on arms  Eczema on head is not flared up  Itchiness increases with sweating and being in the sun  Denies family history of sunlight sensitivity  Denies coming in contact with poison ivy, walks in woods frequently  Has not change changed soaps, detergent (dye free) recently      Review of Systems   Constitutional:  Negative for chills, fatigue and fever.   Respiratory:  Negative for shortness of breath.    Gastrointestinal:  Negative for constipation and diarrhea.   Skin:  Positive for rash.   Neurological:  Negative for dizziness.       Objective   /61 (BP Location: Right arm, Patient Position: Sitting, BP Cuff Size: Adult)   Pulse 91   Temp 36.7 °C (98 °F) (Temporal)   Resp 14   Wt 62.1 kg (137 lb)   SpO2 96%   BMI 22.80 kg/m²     Physical Exam  Constitutional:       Appearance: Normal appearance.   Neurological:      Mental Status: She is alert.       Assessment/Plan

## 2024-09-17 DIAGNOSIS — E78.2 MIXED HYPERLIPIDEMIA: ICD-10-CM

## 2024-09-17 DIAGNOSIS — E55.9 VITAMIN D DEFICIENCY: ICD-10-CM

## 2024-09-17 DIAGNOSIS — Z00.00 WELLNESS EXAMINATION: Primary | ICD-10-CM

## 2024-09-17 DIAGNOSIS — E03.9 HYPOTHYROIDISM, UNSPECIFIED TYPE: ICD-10-CM

## 2024-09-17 DIAGNOSIS — R73.01 ELEVATED FASTING BLOOD SUGAR: ICD-10-CM

## 2024-09-27 ENCOUNTER — APPOINTMENT (OUTPATIENT)
Dept: PRIMARY CARE | Facility: CLINIC | Age: 69
End: 2024-09-27
Payer: MEDICARE

## 2024-09-27 ENCOUNTER — LAB (OUTPATIENT)
Dept: LAB | Facility: LAB | Age: 69
End: 2024-09-27
Payer: MEDICARE

## 2024-09-27 DIAGNOSIS — E03.9 HYPOTHYROIDISM, UNSPECIFIED TYPE: ICD-10-CM

## 2024-09-27 DIAGNOSIS — E55.9 VITAMIN D DEFICIENCY: ICD-10-CM

## 2024-09-27 DIAGNOSIS — R73.01 ELEVATED FASTING BLOOD SUGAR: ICD-10-CM

## 2024-09-27 DIAGNOSIS — E78.2 MIXED HYPERLIPIDEMIA: ICD-10-CM

## 2024-09-27 LAB
25(OH)D3 SERPL-MCNC: 63 NG/ML (ref 30–100)
ALBUMIN SERPL BCP-MCNC: 4.2 G/DL (ref 3.4–5)
ALP SERPL-CCNC: 81 U/L (ref 33–136)
ALT SERPL W P-5'-P-CCNC: 18 U/L (ref 7–45)
ANION GAP SERPL CALC-SCNC: 9 MMOL/L (ref 10–20)
AST SERPL W P-5'-P-CCNC: 18 U/L (ref 9–39)
BASOPHILS # BLD AUTO: 0.03 X10*3/UL (ref 0–0.1)
BASOPHILS NFR BLD AUTO: 0.4 %
BILIRUB SERPL-MCNC: 0.9 MG/DL (ref 0–1.2)
BUN SERPL-MCNC: 10 MG/DL (ref 6–23)
CALCIUM SERPL-MCNC: 9.3 MG/DL (ref 8.6–10.3)
CHLORIDE SERPL-SCNC: 103 MMOL/L (ref 98–107)
CHOLEST SERPL-MCNC: 225 MG/DL (ref 0–199)
CHOLESTEROL/HDL RATIO: 5.4
CO2 SERPL-SCNC: 30 MMOL/L (ref 21–32)
CREAT SERPL-MCNC: 0.59 MG/DL (ref 0.5–1.05)
EGFRCR SERPLBLD CKD-EPI 2021: >90 ML/MIN/1.73M*2
EOSINOPHIL # BLD AUTO: 0.68 X10*3/UL (ref 0–0.7)
EOSINOPHIL NFR BLD AUTO: 9.2 %
ERYTHROCYTE [DISTWIDTH] IN BLOOD BY AUTOMATED COUNT: 13.1 % (ref 11.5–14.5)
GLUCOSE SERPL-MCNC: 94 MG/DL (ref 74–99)
HCT VFR BLD AUTO: 45.7 % (ref 36–46)
HDLC SERPL-MCNC: 41.8 MG/DL
HGB BLD-MCNC: 15 G/DL (ref 12–16)
IMM GRANULOCYTES # BLD AUTO: 0.01 X10*3/UL (ref 0–0.7)
IMM GRANULOCYTES NFR BLD AUTO: 0.1 % (ref 0–0.9)
LDLC SERPL CALC-MCNC: 157 MG/DL
LYMPHOCYTES # BLD AUTO: 1.59 X10*3/UL (ref 1.2–4.8)
LYMPHOCYTES NFR BLD AUTO: 21.5 %
MAGNESIUM SERPL-MCNC: 2.07 MG/DL (ref 1.6–2.4)
MCH RBC QN AUTO: 30.4 PG (ref 26–34)
MCHC RBC AUTO-ENTMCNC: 32.8 G/DL (ref 32–36)
MCV RBC AUTO: 93 FL (ref 80–100)
MONOCYTES # BLD AUTO: 0.44 X10*3/UL (ref 0.1–1)
MONOCYTES NFR BLD AUTO: 6 %
NEUTROPHILS # BLD AUTO: 4.63 X10*3/UL (ref 1.2–7.7)
NEUTROPHILS NFR BLD AUTO: 62.8 %
NON HDL CHOLESTEROL: 183 MG/DL (ref 0–149)
NRBC BLD-RTO: 0 /100 WBCS (ref 0–0)
PLATELET # BLD AUTO: 300 X10*3/UL (ref 150–450)
POTASSIUM SERPL-SCNC: 4.4 MMOL/L (ref 3.5–5.3)
PROT SERPL-MCNC: 6.9 G/DL (ref 6.4–8.2)
RBC # BLD AUTO: 4.93 X10*6/UL (ref 4–5.2)
SODIUM SERPL-SCNC: 138 MMOL/L (ref 136–145)
T4 FREE SERPL-MCNC: 1.98 NG/DL (ref 0.61–1.12)
TRIGL SERPL-MCNC: 133 MG/DL (ref 0–149)
TSH SERPL-ACNC: 0.02 MIU/L (ref 0.44–3.98)
VIT B12 SERPL-MCNC: 570 PG/ML (ref 211–911)
VLDL: 27 MG/DL (ref 0–40)
WBC # BLD AUTO: 7.4 X10*3/UL (ref 4.4–11.3)

## 2024-09-27 PROCEDURE — 82306 VITAMIN D 25 HYDROXY: CPT

## 2024-09-27 PROCEDURE — 84443 ASSAY THYROID STIM HORMONE: CPT

## 2024-09-27 PROCEDURE — 82607 VITAMIN B-12: CPT

## 2024-09-27 PROCEDURE — 85025 COMPLETE CBC W/AUTO DIFF WBC: CPT

## 2024-09-27 PROCEDURE — 83036 HEMOGLOBIN GLYCOSYLATED A1C: CPT

## 2024-09-27 PROCEDURE — 80061 LIPID PANEL: CPT

## 2024-09-27 PROCEDURE — 36415 COLL VENOUS BLD VENIPUNCTURE: CPT

## 2024-09-27 PROCEDURE — 83735 ASSAY OF MAGNESIUM: CPT

## 2024-09-27 PROCEDURE — 84439 ASSAY OF FREE THYROXINE: CPT

## 2024-09-27 PROCEDURE — 80053 COMPREHEN METABOLIC PANEL: CPT

## 2024-09-28 LAB
EST. AVERAGE GLUCOSE BLD GHB EST-MCNC: 123 MG/DL
HBA1C MFR BLD: 5.9 %

## 2024-10-04 ENCOUNTER — APPOINTMENT (OUTPATIENT)
Dept: PRIMARY CARE | Facility: CLINIC | Age: 69
End: 2024-10-04
Payer: MEDICARE

## 2024-10-04 VITALS
HEIGHT: 65 IN | HEART RATE: 72 BPM | WEIGHT: 135 LBS | DIASTOLIC BLOOD PRESSURE: 69 MMHG | OXYGEN SATURATION: 96 % | SYSTOLIC BLOOD PRESSURE: 108 MMHG | TEMPERATURE: 97.8 F | BODY MASS INDEX: 22.49 KG/M2

## 2024-10-04 DIAGNOSIS — M15.9 OSTEOARTHRITIS OF MULTIPLE JOINTS, UNSPECIFIED OSTEOARTHRITIS TYPE: ICD-10-CM

## 2024-10-04 DIAGNOSIS — E03.9 ACQUIRED HYPOTHYROIDISM: ICD-10-CM

## 2024-10-04 DIAGNOSIS — R73.01 ELEVATED FASTING BLOOD SUGAR: ICD-10-CM

## 2024-10-04 DIAGNOSIS — L30.9 DERMATITIS: ICD-10-CM

## 2024-10-04 DIAGNOSIS — L30.9 ECZEMA OF SCALP: ICD-10-CM

## 2024-10-04 DIAGNOSIS — E55.9 VITAMIN D DEFICIENCY: ICD-10-CM

## 2024-10-04 DIAGNOSIS — N76.0 ACUTE VAGINITIS: ICD-10-CM

## 2024-10-04 DIAGNOSIS — Z78.0 POST-MENOPAUSE: ICD-10-CM

## 2024-10-04 DIAGNOSIS — E78.2 MIXED HYPERLIPIDEMIA: ICD-10-CM

## 2024-10-04 DIAGNOSIS — Z00.00 ENCOUNTER FOR SUBSEQUENT ANNUAL WELLNESS VISIT (AWV) IN MEDICARE PATIENT: Primary | ICD-10-CM

## 2024-10-04 DIAGNOSIS — Z12.31 BREAST CANCER SCREENING BY MAMMOGRAM: ICD-10-CM

## 2024-10-04 RX ORDER — CLOBETASOL PROPIONATE 0.5 MG/ML
SOLUTION TOPICAL 2 TIMES DAILY
Qty: 150 ML | Refills: 3 | Status: SHIPPED | OUTPATIENT
Start: 2024-10-04 | End: 2025-10-04

## 2024-10-04 RX ORDER — TRIAMCINOLONE ACETONIDE 1 MG/G
CREAM TOPICAL 2 TIMES DAILY
Qty: 80 G | Refills: 1 | Status: SHIPPED | OUTPATIENT
Start: 2024-10-04

## 2024-10-04 ASSESSMENT — ENCOUNTER SYMPTOMS
PSYCHIATRIC NEGATIVE: 1
CARDIOVASCULAR NEGATIVE: 1
GASTROINTESTINAL NEGATIVE: 1
CHEST TIGHTNESS: 0
DIARRHEA: 0
ENDOCRINE NEGATIVE: 1
EYES NEGATIVE: 1
NEUROLOGICAL NEGATIVE: 1
VOMITING: 0
CONSTITUTIONAL NEGATIVE: 1
ALLERGIC/IMMUNOLOGIC NEGATIVE: 1
MUSCULOSKELETAL NEGATIVE: 1
SHORTNESS OF BREATH: 0
HEMATOLOGIC/LYMPHATIC NEGATIVE: 1
NAUSEA: 0
RESPIRATORY NEGATIVE: 1

## 2024-10-04 ASSESSMENT — ACTIVITIES OF DAILY LIVING (ADL)
TAKING_MEDICATION: INDEPENDENT
MANAGING_FINANCES: INDEPENDENT
DRESSING: INDEPENDENT
BATHING: INDEPENDENT
GROCERY_SHOPPING: INDEPENDENT
DOING_HOUSEWORK: INDEPENDENT

## 2024-10-04 ASSESSMENT — PATIENT HEALTH QUESTIONNAIRE - PHQ9
2. FEELING DOWN, DEPRESSED OR HOPELESS: NOT AT ALL
1. LITTLE INTEREST OR PLEASURE IN DOING THINGS: NOT AT ALL
SUM OF ALL RESPONSES TO PHQ9 QUESTIONS 1 AND 2: 0

## 2024-10-04 NOTE — PATIENT INSTRUCTIONS
1.  Medicare wellness visit    Today in the office you had your annual Medicare wellness visit    Will place an order for mammogram for breast cancer screening.    Will place an order for bone density to further evaluate for any possibility of osteoporosis    You are up-to-date with your colonoscopy for colon cancer screening.    You did receive your flu shot today.  If you change your mind in regards to the pneumonia vaccine or COVID booster or shingles vaccine you could have those done at the local pharmacy    Recent labs did indicate your cholesterol is mildly elevated.  I am placing an order for CT scan of the coronary arteries we will contact you with those results    Keep your future appointment with your endocrinologist.  Your recent labs do indicate that you are hyperthyroid indicating you are probably taking too much thyroid medication.  This does put you at increased risk for having irregular heartbeats or atrial flutter/fibrillation and/or increasing risk for osteoporosis    Continue on your steroid topical treatment for your inflammatory skin    We will send a refill in for your Premarin cream as well    Continue eating a heart healthy diet a good goal 5-7 servings of fresh fruit and vegetable daily along with lean protein avoiding the simple sugars avoiding the fast foods    Keep walking keep exercising keep being active 30 minutes 5 days a week is ideal certainly would call if any chest pains with activity    If you otherwise stay healthy I will see you back in 1 year I am happy to see you sooner if needed

## 2024-10-04 NOTE — PROGRESS NOTES
"Subjective   Patient ID: Debora Monzon is a 68 y.o. female who presents for Medicare Annual Wellness Visit Subsequent ([facility]).    HPI     Hyperlipidemia: The patient is presenting today for a follow up of hyperlipidemia. The patient has not been hospitalized for this in the last 6 months. The patient is compliant with medications. Patient denies any side effects to the medications.     Hypothyroidism: Patient presents for evaluation of thyroid function. Energy wise that patient is well. Shestates that within the last month she has not experienced fatigue. T    Arthritis: Nikole Monzon \"Polo" is an 68 y.o. female who presents with arthralgias and myalgias. Pain is located in multiple joints. The patient has not been hospitalized for this in the last 6 months. The patient is compliant with medications. Patient denies any side effects to the medications.     The patient denies any changes in vision, hearing or dental.     The patient maintains they do not have any chest pain, chest tightness or shortness of breath.    They do not experience nausea, emesis, changes in bowel movements or dyspepsia.    The patient's colonoscopy is up to date.    The patient's mammogram is not up to date.    The patient's vaccinations are up to date.      Review of Systems   Constitutional: Negative.    HENT: Negative.  Negative for dental problem and hearing loss.    Eyes: Negative.  Negative for visual disturbance.   Respiratory: Negative.  Negative for chest tightness and shortness of breath.    Cardiovascular: Negative.  Negative for chest pain.   Gastrointestinal: Negative.  Negative for diarrhea, nausea and vomiting.   Endocrine: Negative.    Genitourinary: Negative.    Musculoskeletal: Negative.    Skin: Negative.    Allergic/Immunologic: Negative.    Neurological: Negative.    Hematological: Negative.    Psychiatric/Behavioral: Negative.         Objective   /69 (BP Location: Right arm, Patient Position: Sitting, BP Cuff " "Size: Adult)   Pulse 72   Temp 36.6 °C (97.8 °F) (Temporal)   Ht 1.638 m (5' 4.5\")   Wt 61.2 kg (135 lb)   SpO2 96%   BMI 22.81 kg/m²     Physical Exam  Constitutional:       Appearance: Normal appearance.   HENT:      Head: Normocephalic and atraumatic.      Nose: Nose normal.   Eyes:      Extraocular Movements: Extraocular movements intact.      Conjunctiva/sclera: Conjunctivae normal.      Pupils: Pupils are equal, round, and reactive to light.   Cardiovascular:      Rate and Rhythm: Normal rate and regular rhythm.      Pulses: Normal pulses.      Heart sounds: Normal heart sounds.   Pulmonary:      Effort: Pulmonary effort is normal.      Breath sounds: Normal breath sounds.   Abdominal:      General: Bowel sounds are normal.      Palpations: Abdomen is soft.   Genitourinary:     General: Normal vulva.      Rectum: Normal.   Musculoskeletal:         General: Normal range of motion.      Cervical back: Normal range of motion and neck supple.   Skin:     General: Skin is warm.   Neurological:      Mental Status: She is alert and oriented to person, place, and time.   Psychiatric:         Mood and Affect: Mood normal.         Behavior: Behavior normal.         Thought Content: Thought content normal.         Judgment: Judgment normal.         Assessment/Plan   Problem List Items Addressed This Visit             ICD-10-CM    Arthritis, degenerative M19.90    Relevant Orders    CBC and Auto Differential    Eczema of scalp L30.9    Relevant Medications    clobetasol (Temovate) 0.05 % external solution    Elevated fasting blood sugar R73.01    Relevant Orders    Hemoglobin A1C    Hyperlipidemia E78.5    Relevant Orders    Comprehensive Metabolic Panel    Lipid Panel    Hypothyroidism E03.9    Relevant Orders    CBC and Auto Differential    TSH with reflex to Free T4 if abnormal    Vitamin D deficiency E55.9    Relevant Orders    Vitamin D 25-Hydroxy,Total (for eval of Vitamin D levels)     Other Visit Diagnoses  "        Codes    Encounter for subsequent annual wellness visit (AWV) in Medicare patient    -  Primary Z00.00    Relevant Orders    Follow Up In [facility] - PCP - Medicare Annual    Post-menopause     Z78.0    Relevant Orders    XR DEXA bone density    Breast cancer screening by mammogram     Z12.31    Relevant Orders    BI mammo bilateral screening tomosynthesis    Acute vaginitis     N76.0    Relevant Medications    estrogens, conjugated, (Premarin) vaginal cream (Start on 10/7/2024)    Dermatitis     L30.9    Relevant Medications    triamcinolone (Kenalog) 0.1 % cream               1. Encounter for subsequent annual wellness visit (AWV) in Medicare patient  Follow Up In [facility] - PCP - Medicare Annual    Follow Up In [facility] - Medicare Annual      2. Post-menopause  XR DEXA bone density      3. Breast cancer screening by mammogram  BI mammo bilateral screening tomosynthesis      4. Mixed hyperlipidemia  Comprehensive Metabolic Panel    Lipid Panel      5. Acquired hypothyroidism  CBC and Auto Differential    TSH with reflex to Free T4 if abnormal      6. Osteoarthritis of multiple joints, unspecified osteoarthritis type  CBC and Auto Differential      7. Elevated fasting blood sugar  Hemoglobin A1C      8. Vitamin D deficiency  Vitamin D 25-Hydroxy,Total (for eval of Vitamin D levels)        1.  Medicare wellness visit    Today in the office you had your annual Medicare wellness visit    Will place an order for mammogram for breast cancer screening.    Will place an order for bone density to further evaluate for any possibility of osteoporosis    You are up-to-date with your colonoscopy for colon cancer screening.    You did receive your flu shot today.  If you change your mind in regards to the pneumonia vaccine or COVID booster or shingles vaccine you could have those done at the local pharmacy    Recent labs did indicate your cholesterol is mildly elevated.  I am  placing an order for CT scan of the coronary arteries we will contact you with those results    Keep your future appointment with your endocrinologist.  Your recent labs do indicate that you are hyperthyroid indicating you are probably taking too much thyroid medication.  This does put you at increased risk for having irregular heartbeats or atrial flutter/fibrillation and/or increasing risk for osteoporosis    Continue on your steroid topical treatment for your inflammatory skin    We will send a refill in for your Premarin cream as well    Continue eating a heart healthy diet a good goal 5-7 servings of fresh fruit and vegetable daily along with lean protein avoiding the simple sugars avoiding the fast foods    Keep walking keep exercising keep being active 30 minutes 5 days a week is ideal certainly would call if any chest pains with activity    If you otherwise stay healthy I will see you back in 1 year I am happy to see you sooner if needed    Follow-up in 6 months or sooner if there are any concerns.    Scribe Attestation  By signing my name below, IChelsy, Scribe   attest that this documentation has been prepared under the direction and in the presence of Gurjit Streeter MD.    This note has been transcribed using a medical scribe and there is a possibility of unintentional typing misprints.

## 2024-10-17 ENCOUNTER — TELEPHONE (OUTPATIENT)
Dept: PRIMARY CARE | Facility: CLINIC | Age: 69
End: 2024-10-17
Payer: MEDICARE

## 2024-10-17 DIAGNOSIS — E03.9 ACQUIRED HYPOTHYROIDISM: Primary | ICD-10-CM

## 2024-10-17 NOTE — TELEPHONE ENCOUNTER
Chasidy called pt to schedule a test. Pt wants to get the CT Cardiac Score done. [facility] did not put in an order. Send this to Chasidy to schedule pt once order is placed.

## 2024-10-24 ENCOUNTER — OFFICE VISIT (OUTPATIENT)
Dept: URGENT CARE | Age: 69
End: 2024-10-24
Payer: MEDICARE

## 2024-10-24 ENCOUNTER — ANCILLARY PROCEDURE (OUTPATIENT)
Dept: URGENT CARE | Age: 69
End: 2024-10-24
Payer: MEDICARE

## 2024-10-24 VITALS
DIASTOLIC BLOOD PRESSURE: 76 MMHG | OXYGEN SATURATION: 97 % | HEART RATE: 70 BPM | SYSTOLIC BLOOD PRESSURE: 135 MMHG | TEMPERATURE: 97.4 F

## 2024-10-24 DIAGNOSIS — M25.561 ACUTE PAIN OF RIGHT KNEE: Primary | ICD-10-CM

## 2024-10-24 DIAGNOSIS — M25.561 ACUTE PAIN OF RIGHT KNEE: ICD-10-CM

## 2024-10-24 DIAGNOSIS — M17.10 ARTHRITIS OF KNEE: ICD-10-CM

## 2024-10-24 PROCEDURE — 73562 X-RAY EXAM OF KNEE 3: CPT | Mod: RIGHT SIDE | Performed by: NURSE PRACTITIONER

## 2024-10-24 RX ORDER — CYCLOBENZAPRINE HCL 10 MG
10 TABLET ORAL NIGHTLY PRN
Qty: 30 TABLET | Refills: 0 | Status: SHIPPED | OUTPATIENT
Start: 2024-10-24 | End: 2024-11-23

## 2024-10-24 RX ORDER — METHYLPREDNISOLONE 4 MG/1
TABLET ORAL
Qty: 21 TABLET | Refills: 0 | Status: SHIPPED | OUTPATIENT
Start: 2024-10-24

## 2024-10-24 ASSESSMENT — ENCOUNTER SYMPTOMS
TINGLING: 0
ARTHRALGIAS: 1
MUSCLE WEAKNESS: 0
LOSS OF MOTION: 0
NUMBNESS: 0
INABILITY TO BEAR WEIGHT: 1
LOSS OF SENSATION: 0

## 2024-10-24 NOTE — PROGRESS NOTES
"Subjective   Patient ID: Nikole Monzon \"Polo" is a 68 y.o. female. They present today with a chief complaint of Knee Pain.    History of Present Illness  Patient reported pain in the right lateral knee rated 6 out of 10 for 1 day.  The pain started when she was going into her camper and was pulling herself up. She heard a pop and the pain started.  Icing for 45 minutes did not improve the pain.  Patient denied pain at rest but reported pain with weightbearing on the right knee.      History provided by:  Patient   used: No    Knee Pain   Incident onset: 1 day. The pain is at a severity of 6/10. Associated symptoms include an inability to bear weight. Pertinent negatives include no loss of motion, loss of sensation, muscle weakness, numbness or tingling.       Past Medical History  Allergies as of 10/24/2024 - Reviewed 10/24/2024   Allergen Reaction Noted    Penicillins Rash 10/24/2024       (Not in a hospital admission)       History reviewed. No pertinent past medical history.    History reviewed. No pertinent surgical history.         Review of Systems  Review of Systems   Neurological:  Negative for tingling and numbness.                                  Objective    Vitals:    10/24/24 1810   BP: 135/76   Pulse: 70   Temp: 36.3 °C (97.4 °F)   SpO2: 97%     No LMP recorded.    Physical Exam  Vitals and nursing note reviewed.   Constitutional:       Appearance: Normal appearance.   HENT:      Head: Normocephalic and atraumatic.   Cardiovascular:      Rate and Rhythm: Normal rate and regular rhythm.   Pulmonary:      Effort: Pulmonary effort is normal.      Breath sounds: Normal breath sounds.   Musculoskeletal:      Comments: Right patella without swelling redness, swelling, bleeding or discharge. Skin intact. No tenderness over the medial joint line of the right knee. Tenderness noted over the lateral joint line of the right  knee. Extensor mechanism is intact. There is no noted laxity. " Left lower extremity neurovascularly intact. The remainder of the extremity is nontender.     Neurological:      Mental Status: She is alert.         Procedures    Point of Care Test & Imaging Results from this visit  No results found for this visit on 10/24/24.   XR knee right 3 views    Result Date: 10/24/2024  Interpreted By:  Pepito Heart, STUDY: XR KNEE RIGHT 3 VIEWS   INDICATION: Signs/Symptoms:right knee pain.   COMPARISON: None   ACCESSION NUMBER(S): [id number]   ORDERING CLINICIAN: KATE GLASER   FINDINGS: Mild osteoarthritis right knee. No fracture. Alignment normal.       Mild right knee osteoarthritis.   Signed by: Pepito Heart 10/24/2024 6:46 PM Dictation workstation:   [id number]     Diagnostic study results (if any) were reviewed by LUCÍA Lazo.    Assessment/Plan   Allergies, medications, history, and pertinent labs/EKGs/Imaging reviewed by LUCÍA Lazo.     Medical Decision Making  Stop ibuprofen while taking Medrol Dosepak. Resume ibuprofen after finishing Medrol Dosepak.  Take all medications as instructed.  Rest, ice, elevation and compression discussed.  Take Tylenol and/or ibuprofen as needed for aches and pain.  Pain should improve in 1-2 weeks.  Referred to ortho.  If symptoms do not improve, advised to return and/or follow-up with PCP.  Call 911 or go to the nearest ER if the symptoms worsen.  Patient verbalized understanding of these instructions and left in stable condition.     Orders and Diagnoses  Diagnoses and all orders for this visit:  Acute pain of right knee  -     XR knee right 3 views; Future  -     methylPREDNISolone (Medrol Dospak) 4 mg tablets; Take as directed on package.  -     cyclobenzaprine (Flexeril) 10 mg tablet; Take 1 tablet (10 mg) by mouth as needed at bedtime for muscle spasms.  -     Referral to Orthopaedic Surgery; Future  Arthritis of knee      Medical Admin Record      Patient disposition: Home    Electronically signed by Kate Glaser  APRN-CNP  7:12 PM

## 2024-10-25 ENCOUNTER — TELEPHONE (OUTPATIENT)
Dept: PRIMARY CARE | Facility: CLINIC | Age: 69
End: 2024-10-25

## 2024-10-25 NOTE — TELEPHONE ENCOUNTER
Patient would like providers recommendation on orthopedic specialist after going to urgent care for knee issue    She would like her xray reviewed and further instructions    Aware PCP is out of office and this will not be send urgently

## 2024-10-29 DIAGNOSIS — M25.569 KNEE PAIN, UNSPECIFIED CHRONICITY, UNSPECIFIED LATERALITY: Primary | ICD-10-CM

## 2024-11-08 ENCOUNTER — APPOINTMENT (OUTPATIENT)
Dept: ENDOCRINOLOGY | Facility: CLINIC | Age: 69
End: 2024-11-08
Payer: MEDICARE

## 2024-11-08 ENCOUNTER — TELEPHONE (OUTPATIENT)
Facility: CLINIC | Age: 69
End: 2024-11-08

## 2024-11-08 VITALS
HEIGHT: 64 IN | DIASTOLIC BLOOD PRESSURE: 62 MMHG | HEART RATE: 68 BPM | BODY MASS INDEX: 23.41 KG/M2 | WEIGHT: 137.1 LBS | RESPIRATION RATE: 16 BRPM | SYSTOLIC BLOOD PRESSURE: 110 MMHG

## 2024-11-08 DIAGNOSIS — E03.9 HYPOTHYROIDISM, UNSPECIFIED TYPE: Primary | ICD-10-CM

## 2024-11-08 DIAGNOSIS — E03.9 ACQUIRED HYPOTHYROIDISM: ICD-10-CM

## 2024-11-08 PROBLEM — R30.0 DYSURIA: Status: RESOLVED | Noted: 2023-09-22 | Resolved: 2024-11-08

## 2024-11-08 PROBLEM — S82.402A LEFT FIBULAR FRACTURE: Status: RESOLVED | Noted: 2023-09-22 | Resolved: 2024-11-08

## 2024-11-08 PROCEDURE — 1036F TOBACCO NON-USER: CPT | Performed by: INTERNAL MEDICINE

## 2024-11-08 PROCEDURE — 99213 OFFICE O/P EST LOW 20 MIN: CPT | Performed by: INTERNAL MEDICINE

## 2024-11-08 PROCEDURE — 1160F RVW MEDS BY RX/DR IN RCRD: CPT | Performed by: INTERNAL MEDICINE

## 2024-11-08 PROCEDURE — 1123F ACP DISCUSS/DSCN MKR DOCD: CPT | Performed by: INTERNAL MEDICINE

## 2024-11-08 PROCEDURE — 3008F BODY MASS INDEX DOCD: CPT | Performed by: INTERNAL MEDICINE

## 2024-11-08 PROCEDURE — 1159F MED LIST DOCD IN RCRD: CPT | Performed by: INTERNAL MEDICINE

## 2024-11-08 PROCEDURE — G2211 COMPLEX E/M VISIT ADD ON: HCPCS | Performed by: INTERNAL MEDICINE

## 2024-11-08 RX ORDER — LEVOTHYROXINE SODIUM 137 UG/1
137 TABLET ORAL DAILY
Qty: 90 TABLET | Refills: 3 | Status: SHIPPED | OUTPATIENT
Start: 2024-11-08 | End: 2025-11-08

## 2024-11-08 ASSESSMENT — ENCOUNTER SYMPTOMS
COUGH: 0
PALPITATIONS: 0
VOMITING: 0
DIARRHEA: 0
FEVER: 0
NAUSEA: 0
SHORTNESS OF BREATH: 0
CHILLS: 0
HEADACHES: 0
FATIGUE: 0
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
## 2024-11-08 NOTE — ASSESSMENT & PLAN NOTE
Discussed labs and detrimental effects of over rx.   She agreed to reduce to 137 mcg  Recheck labs in 2 months  Follow up in 6 months  Orders:    TSH with reflex to Free T4 if abnormal; Future

## 2024-11-08 NOTE — ASSESSMENT & PLAN NOTE
Orders:    levothyroxine (Synthroid) 137 mcg tablet; Take 1 tablet (137 mcg) by mouth once daily.

## 2024-11-09 ENCOUNTER — TELEPHONE (OUTPATIENT)
Dept: PRIMARY CARE | Facility: CLINIC | Age: 69
End: 2024-11-09
Payer: MEDICARE

## 2024-11-09 DIAGNOSIS — K57.92 DIVERTICULITIS: Primary | ICD-10-CM

## 2024-11-09 RX ORDER — METRONIDAZOLE 500 MG/1
500 TABLET ORAL 3 TIMES DAILY
Qty: 30 TABLET | Refills: 0 | Status: SHIPPED | OUTPATIENT
Start: 2024-11-09 | End: 2024-11-19

## 2024-11-09 RX ORDER — CIPROFLOXACIN 500 MG/1
500 TABLET ORAL 2 TIMES DAILY
Qty: 20 TABLET | Refills: 0 | Status: SHIPPED | OUTPATIENT
Start: 2024-11-09 | End: 2024-11-19

## 2024-11-09 NOTE — TELEPHONE ENCOUNTER
Patient states she is having a diverticulitis flare up, is asking for medication to be sent in    States she is unable to come into office    LLQ discomfort, started at 2 AM    Denies fever/nausea     Marcs Tilly

## 2024-11-09 NOTE — TELEPHONE ENCOUNTER
Patient does feel more comfortable having them if needed during the weekend     States only allergy is penicillins

## 2024-11-09 NOTE — TELEPHONE ENCOUNTER
Current guidelines do not recommend antibiotics at the onset of diverticulitis, but if becomes more severe can treat with Flagyl and (Cipro OR Bactrim). If feels more comfortable starting now I can send in, want to to confirm that only allergy to antibiotics is penicillins. Otherwise the recommendation is for bland, soft diet and watch and wait approach.

## 2024-11-22 ENCOUNTER — HOSPITAL ENCOUNTER (OUTPATIENT)
Dept: RADIOLOGY | Facility: CLINIC | Age: 69
Discharge: HOME | End: 2024-11-22
Payer: MEDICARE

## 2024-11-22 VITALS — WEIGHT: 137.13 LBS | BODY MASS INDEX: 23.41 KG/M2 | HEIGHT: 64 IN

## 2024-11-22 DIAGNOSIS — Z78.0 POST-MENOPAUSE: ICD-10-CM

## 2024-11-22 DIAGNOSIS — Z12.31 BREAST CANCER SCREENING BY MAMMOGRAM: ICD-10-CM

## 2024-11-22 PROCEDURE — 77080 DXA BONE DENSITY AXIAL: CPT

## 2024-11-22 PROCEDURE — 77067 SCR MAMMO BI INCL CAD: CPT

## 2025-01-09 ENCOUNTER — LAB (OUTPATIENT)
Dept: LAB | Facility: LAB | Age: 70
End: 2025-01-09
Payer: MEDICARE

## 2025-01-09 DIAGNOSIS — E03.9 HYPOTHYROIDISM, UNSPECIFIED TYPE: ICD-10-CM

## 2025-01-09 LAB
T4 FREE SERPL-MCNC: 2.07 NG/DL (ref 0.61–1.12)
TSH SERPL-ACNC: 0.02 MIU/L (ref 0.44–3.98)

## 2025-01-09 PROCEDURE — 84439 ASSAY OF FREE THYROXINE: CPT

## 2025-01-09 PROCEDURE — 84443 ASSAY THYROID STIM HORMONE: CPT

## 2025-01-12 DIAGNOSIS — E03.9 HYPOTHYROIDISM, UNSPECIFIED TYPE: Primary | ICD-10-CM

## 2025-02-14 ENCOUNTER — HOSPITAL ENCOUNTER (OUTPATIENT)
Dept: RADIOLOGY | Facility: CLINIC | Age: 70
Discharge: HOME | End: 2025-02-14
Payer: MEDICARE

## 2025-02-14 DIAGNOSIS — E03.9 ACQUIRED HYPOTHYROIDISM: ICD-10-CM

## 2025-02-14 PROCEDURE — 75571 CT HRT W/O DYE W/CA TEST: CPT

## 2025-02-26 DIAGNOSIS — E78.2 MIXED HYPERLIPIDEMIA: ICD-10-CM

## 2025-02-26 DIAGNOSIS — R93.1 ELEVATED CORONARY ARTERY CALCIUM SCORE: Primary | ICD-10-CM

## 2025-02-26 DIAGNOSIS — Z00.00 WELLNESS EXAMINATION: ICD-10-CM

## 2025-02-26 RX ORDER — ROSUVASTATIN CALCIUM 5 MG/1
5 TABLET, COATED ORAL DAILY
Qty: 30 TABLET | Refills: 11 | Status: SHIPPED | OUTPATIENT
Start: 2025-02-26 | End: 2026-02-26

## 2025-03-07 LAB
T4 FREE SERPL-MCNC: 2 NG/DL (ref 0.8–1.8)
TSH SERPL-ACNC: 0.03 MIU/L (ref 0.4–4.5)

## 2025-03-08 DIAGNOSIS — E03.9 HYPOTHYROIDISM, UNSPECIFIED TYPE: Primary | ICD-10-CM

## 2025-05-01 LAB
T3FREE SERPL-MCNC: 2.7 PG/ML (ref 2.3–4.2)
T4 FREE SERPL-MCNC: 1.5 NG/DL (ref 0.8–1.8)
TSH SERPL-ACNC: 1.71 MIU/L (ref 0.4–4.5)

## 2025-05-09 ENCOUNTER — APPOINTMENT (OUTPATIENT)
Facility: CLINIC | Age: 70
End: 2025-05-09
Payer: MEDICARE

## 2025-05-09 VITALS
HEART RATE: 76 BPM | DIASTOLIC BLOOD PRESSURE: 72 MMHG | WEIGHT: 138.9 LBS | HEIGHT: 64 IN | RESPIRATION RATE: 16 BRPM | BODY MASS INDEX: 23.71 KG/M2 | SYSTOLIC BLOOD PRESSURE: 110 MMHG

## 2025-05-09 DIAGNOSIS — E03.9 ACQUIRED HYPOTHYROIDISM: ICD-10-CM

## 2025-05-09 DIAGNOSIS — E03.9 HYPOTHYROIDISM, UNSPECIFIED TYPE: Primary | ICD-10-CM

## 2025-05-09 PROCEDURE — 99213 OFFICE O/P EST LOW 20 MIN: CPT | Performed by: INTERNAL MEDICINE

## 2025-05-09 PROCEDURE — 1123F ACP DISCUSS/DSCN MKR DOCD: CPT | Performed by: INTERNAL MEDICINE

## 2025-05-09 PROCEDURE — 3008F BODY MASS INDEX DOCD: CPT | Performed by: INTERNAL MEDICINE

## 2025-05-09 PROCEDURE — G2211 COMPLEX E/M VISIT ADD ON: HCPCS | Performed by: INTERNAL MEDICINE

## 2025-05-09 PROCEDURE — 1159F MED LIST DOCD IN RCRD: CPT | Performed by: INTERNAL MEDICINE

## 2025-05-09 PROCEDURE — 1160F RVW MEDS BY RX/DR IN RCRD: CPT | Performed by: INTERNAL MEDICINE

## 2025-05-09 PROCEDURE — 1036F TOBACCO NON-USER: CPT | Performed by: INTERNAL MEDICINE

## 2025-05-09 RX ORDER — LEVOTHYROXINE SODIUM 137 UG/1
137 TABLET ORAL DAILY
Qty: 90 TABLET | Refills: 3 | Status: SHIPPED | OUTPATIENT
Start: 2025-05-09 | End: 2026-05-09

## 2025-05-09 ASSESSMENT — ENCOUNTER SYMPTOMS
PALPITATIONS: 0
CHILLS: 0
FEVER: 0
HEADACHES: 0
VOMITING: 0
DIARRHEA: 0
SHORTNESS OF BREATH: 0
NAUSEA: 0
FATIGUE: 0
COUGH: 0
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
## 2025-05-09 NOTE — ASSESSMENT & PLAN NOTE
Thyroid labs look great  Continue current dosing  Follow up in 6 months then yearly  Orders:    Thyroxine, Free; Future    Thyroid Stimulating Hormone; Future    Triiodothyronine, Free; Future    TSH with reflex to Free T4 if abnormal; Future

## 2025-07-23 LAB
T3FREE SERPL-MCNC: 2.8 PG/ML (ref 2.3–4.2)
T4 FREE SERPL-MCNC: 1.4 NG/DL (ref 0.8–1.8)
TSH SERPL-ACNC: 1.37 MIU/L (ref 0.4–4.5)

## 2025-07-25 DIAGNOSIS — E03.9 HYPOTHYROIDISM, UNSPECIFIED TYPE: Primary | ICD-10-CM

## 2025-10-10 ENCOUNTER — APPOINTMENT (OUTPATIENT)
Dept: PRIMARY CARE | Facility: CLINIC | Age: 70
End: 2025-10-10
Payer: MEDICARE

## 2025-11-14 ENCOUNTER — APPOINTMENT (OUTPATIENT)
Facility: CLINIC | Age: 70
End: 2025-11-14
Payer: MEDICARE